# Patient Record
Sex: MALE
[De-identification: names, ages, dates, MRNs, and addresses within clinical notes are randomized per-mention and may not be internally consistent; named-entity substitution may affect disease eponyms.]

---

## 2018-07-02 ENCOUNTER — HOSPITAL ENCOUNTER (INPATIENT)
Dept: HOSPITAL 5 - ED | Age: 48
LOS: 6 days | Discharge: HOME | DRG: 393 | End: 2018-07-08
Attending: INTERNAL MEDICINE | Admitting: INTERNAL MEDICINE
Payer: SELF-PAY

## 2018-07-02 DIAGNOSIS — Z89.429: ICD-10-CM
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
## 2018-07-02 DIAGNOSIS — Z88.8: ICD-10-CM

## 2018-07-02 DIAGNOSIS — K95.89: ICD-10-CM
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
## 2018-07-02 DIAGNOSIS — K25.9: ICD-10-CM

## 2018-07-02 DIAGNOSIS — Z79.01: ICD-10-CM

## 2018-07-02 DIAGNOSIS — Z95.2: ICD-10-CM

## 2018-07-02 DIAGNOSIS — Z95.5: ICD-10-CM

## 2018-07-02 DIAGNOSIS — Z88.6: ICD-10-CM

## 2018-07-02 DIAGNOSIS — I26.99: ICD-10-CM

## 2018-07-02 DIAGNOSIS — Z79.899: ICD-10-CM

## 2018-07-02 DIAGNOSIS — Z98.84: ICD-10-CM

## 2018-07-02 DIAGNOSIS — K91.89: Primary | ICD-10-CM

## 2018-07-02 DIAGNOSIS — K28.9: ICD-10-CM

## 2018-07-02 DIAGNOSIS — Z90.49: ICD-10-CM

## 2018-07-02 DIAGNOSIS — Z82.49: ICD-10-CM

## 2018-07-02 DIAGNOSIS — Z95.1: ICD-10-CM

## 2018-07-02 LAB
APTT BLD: 26.2 SEC. (ref 24.2–36.6)
BASOPHILS # (AUTO): 0 K/MM3 (ref 0–0.1)
BASOPHILS NFR BLD AUTO: 0.4 % (ref 0–1.8)
BUN SERPL-MCNC: 10 MG/DL (ref 9–20)
BUN/CREAT SERPL: 10 %
CALCIUM SERPL-MCNC: 8.5 MG/DL (ref 8.4–10.2)
EOSINOPHIL # BLD AUTO: 0 K/MM3 (ref 0–0.4)
EOSINOPHIL NFR BLD AUTO: 0.1 % (ref 0–4.3)
HCT VFR BLD CALC: 34.2 % (ref 35.5–45.6)
HDLC SERPL-MCNC: 68 MG/DL (ref 40–59)
HEMOLYSIS INDEX: 24
HGB BLD-MCNC: 11.5 GM/DL (ref 11.8–15.2)
INR PPP: 0.91 (ref 0.87–1.13)
LYMPHOCYTES # BLD AUTO: 0.9 K/MM3 (ref 1.2–5.4)
LYMPHOCYTES NFR BLD AUTO: 20.1 % (ref 13.4–35)
MCH RBC QN AUTO: 31 PG (ref 28–32)
MCHC RBC AUTO-ENTMCNC: 34 % (ref 32–34)
MCV RBC AUTO: 91 FL (ref 84–94)
MONOCYTES # (AUTO): 0.2 K/MM3 (ref 0–0.8)
MONOCYTES % (AUTO): 5.1 % (ref 0–7.3)
PLATELET # BLD: 236 K/MM3 (ref 140–440)
RBC # BLD AUTO: 3.76 M/MM3 (ref 3.65–5.03)
T4 FREE SERPL-MCNC: 1.05 NG/DL (ref 0.76–1.46)

## 2018-07-02 PROCEDURE — 80048 BASIC METABOLIC PNL TOTAL CA: CPT

## 2018-07-02 PROCEDURE — 85730 THROMBOPLASTIN TIME PARTIAL: CPT

## 2018-07-02 PROCEDURE — 85027 COMPLETE CBC AUTOMATED: CPT

## 2018-07-02 PROCEDURE — 85025 COMPLETE CBC W/AUTO DIFF WBC: CPT

## 2018-07-02 PROCEDURE — 85018 HEMOGLOBIN: CPT

## 2018-07-02 PROCEDURE — 84439 ASSAY OF FREE THYROXINE: CPT

## 2018-07-02 PROCEDURE — 84443 ASSAY THYROID STIM HORMONE: CPT

## 2018-07-02 PROCEDURE — 93010 ELECTROCARDIOGRAM REPORT: CPT

## 2018-07-02 PROCEDURE — 87116 MYCOBACTERIA CULTURE: CPT

## 2018-07-02 PROCEDURE — 93306 TTE W/DOPPLER COMPLETE: CPT

## 2018-07-02 PROCEDURE — 85379 FIBRIN DEGRADATION QUANT: CPT

## 2018-07-02 PROCEDURE — 93005 ELECTROCARDIOGRAM TRACING: CPT

## 2018-07-02 PROCEDURE — 85610 PROTHROMBIN TIME: CPT

## 2018-07-02 PROCEDURE — 85014 HEMATOCRIT: CPT

## 2018-07-02 PROCEDURE — 85520 HEPARIN ASSAY: CPT

## 2018-07-02 PROCEDURE — 80061 LIPID PANEL: CPT

## 2018-07-02 PROCEDURE — 83880 ASSAY OF NATRIURETIC PEPTIDE: CPT

## 2018-07-02 PROCEDURE — 85049 AUTOMATED PLATELET COUNT: CPT

## 2018-07-02 PROCEDURE — 36415 COLL VENOUS BLD VENIPUNCTURE: CPT

## 2018-07-02 PROCEDURE — 84484 ASSAY OF TROPONIN QUANT: CPT

## 2018-07-02 PROCEDURE — 71275 CT ANGIOGRAPHY CHEST: CPT

## 2018-07-02 RX ADMIN — MORPHINE SULFATE PRN MG: 2 INJECTION, SOLUTION INTRAMUSCULAR; INTRAVENOUS at 20:48

## 2018-07-02 RX ADMIN — Medication SCH: at 23:01

## 2018-07-02 RX ADMIN — MORPHINE SULFATE PRN MG: 2 INJECTION, SOLUTION INTRAMUSCULAR; INTRAVENOUS at 16:38

## 2018-07-02 RX ADMIN — ALPRAZOLAM PRN MG: 1 TABLET ORAL at 23:44

## 2018-07-02 RX ADMIN — ALPRAZOLAM PRN MG: 1 TABLET ORAL at 18:37

## 2018-07-02 RX ADMIN — Medication SCH ML: at 20:50

## 2018-07-02 NOTE — EMERGENCY DEPARTMENT REPORT
ED Chest Pain HPI





- General


Chief Complaint: Chest Pain


Stated Complaint: CHEST PAIN


Time Seen by Provider: 07/02/18 10:40


Source: patient


Mode of arrival: Ambulatory


Limitations: No Limitations





- History of Present Illness


Initial Comments: 





Mr. Russo is a 47-year-old male with history of coronary artery disease 

status post 2 vessel CABG and PCI.  He presents with severe chest pain.  Pain 

is substernal radiating to the right neck.  Radiating to the left shoulder.  No 

associated shortness signs of breath.  No nausea vomiting.  3 nitroglycerin 

tabs did not provide any relief.  





One year ago he underwent 2 vessel CABG at [facility] in [facility].  Subsequently 6 months after the CABG, he underwent PCI with 

increased cardiac stents placed.  








He is unable to take aspirin or Plavix due to history of GI bleed related to 

peptic ulcer disease.  





Today he also had a fall.  He has bilateral knee abrasions.





He has relocated to [place].  He grew up in the area.  His former PCP is 

located in [place].  He has re-established care with this provider recently.





Past medical history: Coronary artery disease anemia diabetes in remission 

after gastric bypass 





Past surgery history:  recent cholecystectomy 1 week ago at [facility]


Gastric bypass


CABG


Aortic valve replacement


PCI





Medications:


Atorvastatin


Metoprolol


Protonix


Aspirin


Alprazolam


Ambien





Severity scale (0 -10): 8





- Related Data


 Allergies











Allergy/AdvReac Type Severity Reaction Status Date / Time


 


lisinopril Allergy  Shortness Verified 07/02/18 09:58





   of Breath  


 


NSAIDS (Non-Steroidal Allergy  Nausea Verified 07/02/18 09:58





Anti-Inflamma     














Heart Score





- HEART Score


History: Highly suspicious


EKG: Non-specific


Age: 45-65


Risk factors: > 3 risk factors or hx of atherosclerotic disease


Troponin: < normal limit


HEART Score: 6





ED Review of Systems


ROS: 


Stated complaint: CHEST PAIN


Other details as noted in HPI





Comment: All other systems reviewed and negative


Constitutional: denies: fever, malaise


Respiratory: denies: cough


Cardiovascular: chest pain





ED Past Medical Hx





- Past Medical History


Previous Medical History?: Yes


Hx Heart Attack/AMI: Yes


Hx Diabetes: Yes (no meds)


Additional medical history: CAD, Anemia, Chest pain





- Surgical History


Past Surgical History?: Yes


Hx Coronary Stent: Yes


Hx Open Heart Surgery: Yes


Hx Cholecystectomy: Yes





- Social History


Smoking Status: Never Smoker


Substance Use Type: Alcohol, Prescribed





ED Physical Exam





- General


Limitations: No Limitations


General appearance: alert, in no apparent distress





- Head


Head exam: Present: atraumatic, normocephalic





- Eye


Eye exam: Present: normal appearance





- ENT


ENT exam: Present: mucous membranes moist





- Neck


Neck exam: Present: normal inspection





- Respiratory


Respiratory exam: Present: normal lung sounds bilaterally.  Absent: respiratory 

distress, wheezes, rales, rhonchi





- Cardiovascular


Cardiovascular Exam: Present: regular rate, normal rhythm, normal heart sounds.

  Absent: systolic murmur, diastolic murmur, rubs, gallop





- GI/Abdominal


GI/Abdominal exam: Present: soft, normal bowel sounds.  Absent: distended, 

tenderness, guarding, rebound





- Rectal


Rectal exam: Present: deferred





- Extremities Exam


Extremities exam: Present: normal inspection





- Back Exam


Back exam: Present: normal inspection





- Neurological Exam


Neurological exam: Present: alert, oriented X3





- Psychiatric


Psychiatric exam: Present: normal affect, normal mood





- Skin


Skin exam: Present: warm, dry, intact, normal color, abrasion (bilateral knee 

abrasions, chronic healing ulcer left lower leg ).  Absent: rash





ED Course





 Vital Signs











  07/02/18 07/02/18





  09:58 10:22


 


Temperature 97.8 F 97.9 F


 


Pulse Rate 56 L 53 L


 


Respiratory 18 13





Rate  


 


Blood Pressure 146/82 


 


Blood Pressure  151/84





[Left]  


 


O2 Sat by Pulse 99 99





Oximetry  














ED Medical Decision Making





- Lab Data


Result diagrams: 


 07/02/18 10:10





 07/02/18 10:11








 Laboratory Results - last 24 hr











  07/02/18 07/02/18 07/02/18





  10:10 10:11 10:11


 


WBC  4.4 L  


 


RBC  3.76  


 


Hgb  11.5 L  


 


Hct  34.2 L  


 


MCV  91  


 


MCH  31  


 


MCHC  34  


 


RDW  15.9 H  


 


Plt Count  236  


 


Lymph % (Auto)  20.1  


 


Mono % (Auto)  5.1  


 


Eos % (Auto)  0.1  


 


Baso % (Auto)  0.4  


 


Lymph #  0.9 L  


 


Mono #  0.2  


 


Eos #  0.0  


 


Baso #  0.0  


 


Seg Neutrophils %  74.3 H  


 


Seg Neutrophils #  3.3  


 


PT   12.7 


 


INR   0.91 


 


APTT   26.2 


 


Sodium    141


 


Potassium    4.0


 


Chloride    104.6


 


Carbon Dioxide    25


 


Anion Gap    15


 


BUN    10


 


Creatinine    1.0


 


Estimated GFR    > 60


 


BUN/Creatinine Ratio    10


 


Glucose    123 H


 


Calcium    8.5


 


Troponin T    < 0.010














- Radiology Data


Radiology results: report reviewed, image reviewed





- Medical Decision Making





Mr. Russo is a 46 yo male with hx of CAD s/p CABG and PCI who presents with 

ACS.  He also reported fall without syncope.  No significant due to the fall.





Admitted to hospitalist service.  I have also consulted cardiology.


Critical care attestation.: 


If time is entered above; I have spent that time in minutes in the direct care 

of this critically ill patient, excluding procedure time.








ED Disposition


Clinical Impression: 


 ACS (acute coronary syndrome), Fall from ground level





Disposition: DC-09 OP ADMIT IP TO THIS HOSP


Is pt being admited?: Yes


Does the pt Need Aspirin: No


Condition: Stable


Time of Disposition: 11:20

## 2018-07-02 NOTE — HISTORY AND PHYSICAL REPORT
History of Present Illness


Date of admission: 


07/02/18 12:37





Chief complaint: 





Im having chest pain


History of present illness: 





46 YO Male with HTN, Aortic Stenosis S/P AVR, CAD S/P CABG and Stent Placement, 

presents to ED for evaluation. Pt states that he has experienced pain in his 

chest for the past 1 day. Pt states that pain awoke him from sleep. Pt stats 

that pain is 6/10, Substernal, nonradiating, not worsened with exertion, or 

relieved with rest. Pt acknowledges loss of consciousness. Pt denies fever, 

chills, palpitations, NVD, unilateral leg swelling, calf pain, prolonged travel/

immobility, hemoptysis, productive cough, trauma, or recent ill contacts. Pt 

seen and evaluated in ED and found to have symptoms consistent with ACS as well 

as CHF in addition to sinus bradycardia. Pt admitted to telemetry. Cardiology 

team consulted in ED. 








Past History


Past Medical History: CAD, hypertension


Past Surgical History: valve replacement, cholecystectomy, CABG, Other (Stent 

placement, Gastric Bypass)


Social history: single.  denies: smoking, alcohol abuse, prescription drug abuse


Family history: hypertension





Medications and Allergies


 Allergies











Allergy/AdvReac Type Severity Reaction Status Date / Time


 


lisinopril Allergy  Shortness Verified 07/02/18 09:58





   of Breath  


 


NSAIDS (Non-Steroidal Allergy  Nausea Verified 07/02/18 09:58





Anti-Inflamma     











 Home Medications











 Medication  Instructions  Recorded  Confirmed  Last Taken  Type


 


ALPRAZolam [Xanax TAB] 1 mg PO TID PRN 07/02/18 07/02/18 Unknown History


 


AtorvaSTATin [Lipitor] 40 mg PO QHS 07/02/18 07/02/18 Unknown History


 


Cyanocobalamin [Vitamin B-12] 1,000 mcg PO DAILY 07/02/18 07/02/18 Unknown 

History


 


Losartan/Hydrochlorothiazide 1 each PO DAILY 07/02/18 07/02/18 Unknown History





[Losartan-Hctz 50-12.5 mg Tab]     


 


Metoprolol [Lopressor TAB] 50 mg PO PRN 07/02/18 07/02/18 Unknown History


 


Pantoprazole [Protonix] 40 mg PO QDAY 07/02/18 07/02/18 Unknown History


 


Zolpidem Tartrate [Ambien CR] 12.5 mg PO QHS 07/02/18 07/02/18 Unknown History











Active Meds: 


Active Medications





Acetaminophen (Tylenol)  650 mg PO Q4H PRN


   PRN Reason: Pain MILD(1-3)/Fever >100.5/HA


   Last Admin: 07/02/18 14:10 Dose:  650 mg


Alprazolam (Xanax)  1 mg PO TID PRN


   PRN Reason: Anxiety


Morphine Sulfate (Morphine)  2 mg IV Q4H PRN


   PRN Reason: Pain, Moderate (4-6)


   Last Admin: 07/02/18 16:38 Dose:  2 mg


Nitroglycerin (Nitrostat)  0.4 mg SL Q5M PRN


   PRN Reason: Chest Pain


   Last Admin: 07/02/18 13:06 Dose:  0.4 mg


Ondansetron HCl (Zofran)  4 mg IV Q8H PRN


   PRN Reason: Nausea And Vomiting


Sodium Chloride (Sodium Chloride Flush Syringe 10 Ml)  10 ml IV BID YU


Sodium Chloride (Sodium Chloride Flush Syringe 10 Ml)  10 ml IV PRN PRN


   PRN Reason: LINE FLUSH


Sodium Chloride (Sodium Chloride Flush Syringe 10 Ml)  10 ml IV PRN PRN


   PRN Reason: LINE FLUSH











Review of Systems


Constitutional: no weight loss, no weight gain, no fever, no chills


Ears, nose, mouth and throat: no ear pain, no ear discharge, no tinnitis, no 

decreased hearing, no nose pain, no nasal congestion, no sinus pressure


Cardiovascular: chest pain, no orthopnea, no palpitations


Respiratory: no cough, no cough with sputum, no excessive sputum, no hemoptysis

, no shortness of breath


Gastrointestinal: no nausea, no vomiting, no diarrhea, no constipation


Genitourinary Male: no hematuria, no flank pain, no discharge, no urinary 

frequency, no urinary hesitancy, no nocturia


Rectal: no pain, no incontinence, no bleeding


Musculoskeletal: no neck stiffness, no neck pain, no shooting arm pain, no arm 

numbness/tingling, no low back pain, no shooting leg pain


Integumentary: no rash, no pruritis, no redness, no sores, no wounds


Neurological: no paralysis, no weakness, no parathesias, no numbness, no 

tingling, no seizures


Psychiatric: no memory loss, no change in sleep habits, no sleep disturbances, 

no insomnia, no hypersomnia, no change in appetite, no change in libido, no 

suicidal ideation


Endocrine: no cold intolerance, no heat intolerance, no polyphagia, no 

excessive thirst, no polydipsia, no polyuria, no nocturia, no excessive sweating


Hematologic/Lymphatic: no easy bruising, no easy bleeding, no lymphadenopathy, 

no lymphedema


Allergic/Immunologic: no urticaria, no allergic rhinitis, no wheezing, no 

persistent infections, no anaphylaxis, no angioedema





Exam





- Constitutional


Vitals: 


 











Temp Pulse Resp BP Pulse Ox


 


 97.9 F   59 L  20   130/75   98 


 


 07/02/18 16:33  07/02/18 16:33  07/02/18 16:38  07/02/18 16:33  07/02/18 16:33











General appearance: Present: mild distress, cachectic





- EENT


Eyes: Present: PERRL


ENT: hearing intact, clear oral mucosa





- Neck


Neck: Present: supple, normal ROM





- Respiratory


Respiratory effort: normal


Respiratory: bilateral: CTA





- Cardiovascular


Heart Sounds: Present: S1 & S2.  Absent: rub, click





- Extremities


Extremities: pulses symmetrical, No edema


Extremity abnormal: edema


Peripheral Pulses: within normal limits





- Abdominal


General gastrointestinal: Present: soft, non-tender, non-distended, normal 

bowel sounds


Male genitourinary: Present: normal





- Integumentary


Integumentary: Present: clear, warm, dry





- Musculoskeletal


Musculoskeletal: gait normal, strength equal bilaterally





- Psychiatric


Psychiatric: appropriate mood/affect, intact judgment & insight





- Neurologic


Neurologic: CNII-XII intact, moves all extremities





Results





- Labs


CBC & Chem 7: 


 07/02/18 10:10





 07/02/18 10:11


Labs: 


 Abnormal lab results











  07/02/18 07/02/18 07/02/18 Range/Units





  10:10 10:11 10:11 


 


WBC  4.4 L    (4.5-11.0)  K/mm3


 


Hgb  11.5 L    (11.8-15.2)  gm/dl


 


Hct  34.2 L    (35.5-45.6)  %


 


RDW  15.9 H    (13.2-15.2)  %


 


Lymph #  0.9 L    (1.2-5.4)  K/mm3


 


Seg Neutrophils %  74.3 H    (40.0-70.0)  %


 


D-Dimer     (0-234)  ng/mlDDU


 


Glucose   123 H   ()  mg/dL


 


NT-Pro-B Natriuret Pep    5237 H  (0-450)  pg/mL


 


HDL Cholesterol     (40-59)  mg/dL














  07/02/18 07/02/18 Range/Units





  10:11 10:36 


 


WBC    (4.5-11.0)  K/mm3


 


Hgb    (11.8-15.2)  gm/dl


 


Hct    (35.5-45.6)  %


 


RDW    (13.2-15.2)  %


 


Lymph #    (1.2-5.4)  K/mm3


 


Seg Neutrophils %    (40.0-70.0)  %


 


D-Dimer   840.79 H  (0-234)  ng/mlDDU


 


Glucose    ()  mg/dL


 


NT-Pro-B Natriuret Pep    (0-450)  pg/mL


 


HDL Cholesterol  68 H   (40-59)  mg/dL














Assessment and Plan





- Patient Problems


(1) CHF (congestive heart failure)


Current Visit: Yes   Status: Acute   


Qualifiers: 


   Heart failure type: systolic   Heart failure chronicity: acute   Qualified 

Code(s): I50.21 - Acute systolic (congestive) heart failure   


Plan to address problem: 


Admit to telemetry, cardiology consulted in ED, Strict I/O, monitor uop q shift

, daily weight, Echo, supplemental oxygen, BNP, D dimer, 








(2) CAD (coronary artery disease)


Current Visit: Yes   Status: Acute   


Qualifiers: 


   Associated angina: with stable angina 


Plan to address problem: 


Cardiology consulted, admit to telemetry, risk factor reduction, 








(3) ACS (acute coronary syndrome)


Current Visit: Yes   Status: Acute   


Plan to address problem: 


serial cardiac enzymes, ekg, telemetry, cardiology consulted. 








(4) DVT prophylaxis


Current Visit: Yes   Status: Acute   


Plan to address problem: 


SCD to ble while in bed.

## 2018-07-02 NOTE — CONSULTATION
History of Present Illness


Consult date: 07/02/18


Consult reason: chest pain


History of present illness: 





This is a 47yr old male who presents to the emergency department with chest 

pain and syncope thus this cardiac consultation. Patient reports he woke up 

with chest pain, got out of bed and passed out sustaining abrasions to his 

upper and lower extremities. He denies shortness of breath and palpations. 12 

lead ECG is a sinus bradycardia, rate 50, with non-specific Twave abnormalities.





Patient reports a history of coronary artery disease and underwent 2 vessel 

bypass grafting with bioprosthetic aortic valve replacement just over a year 

ago while living in [place]. He has recently relocated to [place] and has yet 

to establish cardiac care. Patient also reports a history of gastric bypass in 

2009, Hypertension and recent cholecystectomy at an outside hospital.





Medications and Allergies


 Allergies











Allergy/AdvReac Type Severity Reaction Status Date / Time


 


lisinopril Allergy  Shortness Verified 07/02/18 09:58





   of Breath  


 


NSAIDS (Non-Steroidal Allergy  Nausea Verified 07/02/18 09:58





Anti-Inflamma     














Physical Examination


 Vital Signs











Temp Pulse Resp BP Pulse Ox


 


 97.8 F   56 L  18   146/82   99 


 


 07/02/18 09:58  07/02/18 09:58  07/02/18 09:58  07/02/18 09:58  07/02/18 09:58











General appearance: no acute distress


HEENT: Positive: PERRL


Cardiac: Positive: Reg Rate and Rhythm


Lungs: Positive: Decreased Breath Sounds


Neuro: Positive: Grossly Intact





Results





 07/02/18 10:10





 07/02/18 10:11


 Coagulation











  07/02/18 Range/Units





  10:11 


 


PT  12.7  (12.2-14.9)  Sec.


 


INR  0.91  (0.87-1.13)  


 


APTT  26.2  (24.2-36.6)  Sec.








 CBC











  07/02/18 Range/Units





  10:10 


 


WBC  4.4 L  (4.5-11.0)  K/mm3


 


RBC  3.76  (3.65-5.03)  M/mm3


 


Hgb  11.5 L  (11.8-15.2)  gm/dl


 


Hct  34.2 L  (35.5-45.6)  %


 


Plt Count  236  (140-440)  K/mm3


 


Lymph #  0.9 L  (1.2-5.4)  K/mm3


 


Mono #  0.2  (0.0-0.8)  K/mm3


 


Eos #  0.0  (0.0-0.4)  K/mm3


 


Baso #  0.0  (0.0-0.1)  K/mm3








 Comprehensive Metabolic Panel











  07/02/18 Range/Units





  10:11 


 


Sodium  141  (137-145)  mmol/L


 


Potassium  4.0  (3.6-5.0)  mmol/L


 


Chloride  104.6  ()  mmol/L


 


Carbon Dioxide  25  (22-30)  mmol/L


 


BUN  10  (9-20)  mg/dL


 


Creatinine  1.0  (0.8-1.5)  mg/dL


 


Glucose  123 H  ()  mg/dL


 


Calcium  8.5  (8.4-10.2)  mg/dL














Assessment and Plan





Syncope


Chest pain


Hx of CAD with 2v CABG and bioprosthetic AVR 2016 while living in [place]


Hypertension


Hx of Gastric bypass 2009

## 2018-07-03 LAB
APTT BLD: 25.1 SEC. (ref 24.2–36.6)
HCT VFR BLD CALC: 35.6 % (ref 35.5–45.6)
HGB BLD-MCNC: 11.5 GM/DL (ref 11.8–15.2)
INR PPP: 0.93 (ref 0.87–1.13)
PLATELET # BLD: 232 K/MM3 (ref 140–440)

## 2018-07-03 RX ADMIN — LOSARTAN POTASSIUM SCH: 50 TABLET, FILM COATED ORAL at 10:09

## 2018-07-03 RX ADMIN — Medication SCH ML: at 22:11

## 2018-07-03 RX ADMIN — ALPRAZOLAM PRN MG: 1 TABLET ORAL at 14:23

## 2018-07-03 RX ADMIN — MORPHINE SULFATE PRN MG: 2 INJECTION, SOLUTION INTRAMUSCULAR; INTRAVENOUS at 21:00

## 2018-07-03 RX ADMIN — MORPHINE SULFATE PRN MG: 2 INJECTION, SOLUTION INTRAMUSCULAR; INTRAVENOUS at 07:10

## 2018-07-03 RX ADMIN — HYDROCHLOROTHIAZIDE SCH MG: 12.5 CAPSULE ORAL at 10:04

## 2018-07-03 RX ADMIN — ALPRAZOLAM PRN MG: 1 TABLET ORAL at 06:43

## 2018-07-03 RX ADMIN — MORPHINE SULFATE PRN MG: 2 INJECTION, SOLUTION INTRAMUSCULAR; INTRAVENOUS at 03:16

## 2018-07-03 RX ADMIN — Medication SCH MCG: at 10:03

## 2018-07-03 RX ADMIN — ALPRAZOLAM PRN MG: 1 TABLET ORAL at 22:10

## 2018-07-03 RX ADMIN — LOSARTAN POTASSIUM SCH: 50 TABLET, FILM COATED ORAL at 09:58

## 2018-07-03 RX ADMIN — FAMOTIDINE SCH MG: 10 INJECTION, SOLUTION INTRAVENOUS at 22:10

## 2018-07-03 RX ADMIN — HEPARIN SODIUM SCH MLS/HR: 5000 INJECTION, SOLUTION INTRAVENOUS at 14:24

## 2018-07-03 RX ADMIN — Medication SCH ML: at 10:10

## 2018-07-03 RX ADMIN — MORPHINE SULFATE PRN MG: 2 INJECTION, SOLUTION INTRAMUSCULAR; INTRAVENOUS at 16:51

## 2018-07-03 RX ADMIN — MORPHINE SULFATE PRN MG: 2 INJECTION, SOLUTION INTRAMUSCULAR; INTRAVENOUS at 12:30

## 2018-07-03 RX ADMIN — ZOLPIDEM TARTRATE PRN MG: 10 TABLET, FILM COATED ORAL at 23:18

## 2018-07-03 NOTE — CAT SCAN REPORT
CTA CHEST:



HISTORY: Syncope.



COMPARISON: none.



TECHNIQUE: Helical CT in 1.25mm intervals following IV contrast.  

Pulmonary embolus protocol.  Sagittal and coronal reformatted images.  

Rotational MIP images.



FINDINGS:





Contrast bolus is satisfactory. A solitary, small, nonocclusive filling 

defect is identified in the second and third order pulmonary arterial 

branches leading to the left lower lobe. This is best demonstrated on 

image 127, series 4. No other emboli are identified.



Thyroid gland: Normal.



Tracheobronchial tree: Normal.



Esophagus: Normal.



Heart: The heart is normal size. Moderate coronary artery 

calcifications are identified. Previous aortic valve replacement 

changes are noted.



Pericardium: Normal.



Mediastinum: Normal.



Lung Fields: normal.



Pleural Spaces: Normal.



Musculoskeletal: Mild thoracic spondylosis. No fracture or suspicious 

bony lesion.





IMPRESSION: 

Positive for pulmonary embolus as described.



These findings were relayed to the patient's RN, Jacque, on 4A at 1104 

hrs.

## 2018-07-03 NOTE — PROGRESS NOTE
Assessment and Plan


Assessment and plan: 


Chest pain due to acute pulmonary embolism.





Acute pulmonary embolism on left.


Seen on CT angiogra of chest.


I informed and discussed with him. he states he has history of PE and DVT and 

was taken off Xarelto few months ago because of history of GI bleed , anemia. 

he also had subdural hematoma last year.


Start heparin drip.


Consult Hematology on call





Coronary artery disease s/p CABG


Cardiology following.


I discussed case with cardiology





Hypertension





Aortic stenosis s/p aortic valve replacement with bioprosthesis





History of pulmonry embolism





History of DVT





History of subdural hematoma





History of gastric bypass and massive weight loss.





Full code status.








History


Interval history: 


Chest pain








Hospitalist Physical





- Physical exam


Narrative exam: 


Gen : Not in acute distress,


HEENT:Normocephalic, atraumatic


Neck: supple, No JVD


Lungs: Clear to auscultation, bilaterally, no rhonchi


Heart :S1 and S2 reg, no murmurs, rubs or gallop


Abd:soft, non tender, non distended, normal bowel sounds


Ext: No edema, no clubbing, no cyanosis, 


Neuro: Awake,alert,oriented x 3, no focal signs


Psych:normal mood














- Constitutional


Vitals: 


 











Temp Pulse Resp BP Pulse Ox


 


 97.9 F   48 L  18   138/76   99 


 


 07/03/18 07:39  07/03/18 07:39  07/03/18 07:39  07/03/18 07:39  07/03/18 07:39














Results





- Labs


CBC & Chem 7: 


 07/03/18 13:22





 07/02/18 10:11


Labs: 


 Laboratory Last Values











WBC  4.4 K/mm3 (4.5-11.0)  L  07/02/18  10:10    


 


RBC  3.76 M/mm3 (3.65-5.03)   07/02/18  10:10    


 


Hgb  11.5 gm/dl (11.8-15.2)  L  07/02/18  10:10    


 


Hct  34.2 % (35.5-45.6)  L  07/02/18  10:10    


 


MCV  91 fl (84-94)   07/02/18  10:10    


 


MCH  31 pg (28-32)   07/02/18  10:10    


 


MCHC  34 % (32-34)   07/02/18  10:10    


 


RDW  15.9 % (13.2-15.2)  H  07/02/18  10:10    


 


Plt Count  236 K/mm3 (140-440)   07/02/18  10:10    


 


Lymph % (Auto)  20.1 % (13.4-35.0)   07/02/18  10:10    


 


Mono % (Auto)  5.1 % (0.0-7.3)   07/02/18  10:10    


 


Eos % (Auto)  0.1 % (0.0-4.3)   07/02/18  10:10    


 


Baso % (Auto)  0.4 % (0.0-1.8)   07/02/18  10:10    


 


Lymph #  0.9 K/mm3 (1.2-5.4)  L  07/02/18  10:10    


 


Mono #  0.2 K/mm3 (0.0-0.8)   07/02/18  10:10    


 


Eos #  0.0 K/mm3 (0.0-0.4)   07/02/18  10:10    


 


Baso #  0.0 K/mm3 (0.0-0.1)   07/02/18  10:10    


 


Seg Neutrophils %  74.3 % (40.0-70.0)  H  07/02/18  10:10    


 


Seg Neutrophils #  3.3 K/mm3 (1.8-7.7)   07/02/18  10:10    


 


PT  12.7 Sec. (12.2-14.9)   07/02/18  10:11    


 


INR  0.91  (0.87-1.13)   07/02/18  10:11    


 


APTT  26.2 Sec. (24.2-36.6)   07/02/18  10:11    


 


D-Dimer  840.79 ng/mlDDU (0-234)  H  07/02/18  10:36    


 


Sodium  141 mmol/L (137-145)   07/02/18  10:11    


 


Potassium  4.0 mmol/L (3.6-5.0)   07/02/18  10:11    


 


Chloride  104.6 mmol/L ()   07/02/18  10:11    


 


Carbon Dioxide  25 mmol/L (22-30)   07/02/18  10:11    


 


Anion Gap  15 mmol/L  07/02/18  10:11    


 


BUN  10 mg/dL (9-20)   07/02/18  10:11    


 


Creatinine  1.0 mg/dL (0.8-1.5)   07/02/18  10:11    


 


Estimated GFR  > 60 ml/min  07/02/18  10:11    


 


BUN/Creatinine Ratio  10 %  07/02/18  10:11    


 


Glucose  123 mg/dL ()  H  07/02/18  10:11    


 


Calcium  8.5 mg/dL (8.4-10.2)   07/02/18  10:11    


 


Troponin T  < 0.010 ng/mL (0.00-0.029)   07/02/18  19:05    


 


NT-Pro-B Natriuret Pep  5237 pg/mL (0-450)  H  07/02/18  10:11    


 


Triglycerides  119 mg/dL (2-149)   07/02/18  10:11    


 


Cholesterol  150 mg/dL ()   07/02/18  10:11    


 


LDL Cholesterol Direct  73 mg/dL ()   07/02/18  10:11    


 


HDL Cholesterol  68 mg/dL (40-59)  H  07/02/18  10:11    


 


Cholesterol/HDL Ratio  2.20 %  07/02/18  10:11    


 


TSH  1.120 mlU/mL (0.270-4.200)   07/02/18  19:05    


 


Free T4  1.05 ng/dL (0.76-1.46)   07/02/18  19:05

## 2018-07-03 NOTE — PROGRESS NOTE
Assessment and Plan





Syncope


Acute PE


Hx of CAD with 2v CABG and bioprosthetic AVR 2016 while living in [place]


Hypertension


Hx of Gastric bypass 2009


Hx of Subdural Hematoma





Recommendations:


Obtain prior cardiac records for review.


Echocardiogram for left ventricular function assessment and bioprosthetic 

aortic valve assessment.








Subjective


Date of service: 07/03/18


Interval history: 





No cardiac events overnight.





Objective


 Vital Signs











  Temp Pulse Resp BP BP Pulse Ox


 


 07/03/18 12:47  98.0 F  55 L  18  152/82   99


 


 07/03/18 10:09   48 L    


 


 07/03/18 09:58   48 L    


 


 07/03/18 08:00    18   


 


 07/03/18 07:39  97.9 F  48 L  18  138/76   99


 


 07/03/18 05:00   48 L    


 


 07/03/18 04:55  97.7 F   20  150/71  


 


 07/02/18 22:45  98.3 F  52 L  18  145/85   99


 


 07/02/18 21:22   64    


 


 07/02/18 21:05    18   


 


 07/02/18 19:27   63   116/68   99


 


 07/02/18 18:37   59 L   130/75  


 


 07/02/18 17:08    20   


 


 07/02/18 16:38    20   


 


 07/02/18 16:33  97.9 F  59 L  18   130/75  98


 


 07/02/18 16:03  97.9 F  59 L  18  130/75   98


 


 07/02/18 15:54   73  11 L   114/78  99


 


 07/02/18 14:50   72  19  114/78   99


 


 07/02/18 14:40   75  14  114/78   100


 


 07/02/18 14:30   75  20  114/78   100


 


 07/02/18 14:20   70  23  114/78   100


 


 07/02/18 14:10   71  19  114/78   100


 


 07/02/18 14:00   73  11 L  114/78   99


 


 07/02/18 13:50   73  12  158/86   100


 


 07/02/18 13:40   71  15  158/86   100














- Physical Examination


General: No Apparent Distress


HEENT: Positive: PERRL


Cardiac: Positive: Bradycardia


Neuro: Positive: Grossly Intact

## 2018-07-03 NOTE — EVENT NOTE
Date: 07/03/18


Called by Nurse that patient has Pulmonry Embolism on CT Angio of Chest. Start 

Lovenox 1mg/kg subcut bid.

## 2018-07-04 LAB
BUN SERPL-MCNC: 18 MG/DL (ref 9–20)
BUN/CREAT SERPL: 23 %
CALCIUM SERPL-MCNC: 7.6 MG/DL (ref 8.4–10.2)
HCT VFR BLD CALC: 30.9 % (ref 35.5–45.6)
HEMOLYSIS INDEX: 3
HGB BLD-MCNC: 10.4 GM/DL (ref 11.8–15.2)
MCH RBC QN AUTO: 31 PG (ref 28–32)
MCHC RBC AUTO-ENTMCNC: 34 % (ref 32–34)
MCV RBC AUTO: 91 FL (ref 84–94)
PLATELET # BLD: 177 K/MM3 (ref 140–440)
RBC # BLD AUTO: 3.4 M/MM3 (ref 3.65–5.03)

## 2018-07-04 RX ADMIN — LOSARTAN POTASSIUM SCH MG: 50 TABLET, FILM COATED ORAL at 10:00

## 2018-07-04 RX ADMIN — ALPRAZOLAM PRN MG: 1 TABLET ORAL at 14:23

## 2018-07-04 RX ADMIN — Medication SCH ML: at 10:00

## 2018-07-04 RX ADMIN — MORPHINE SULFATE PRN MG: 2 INJECTION, SOLUTION INTRAMUSCULAR; INTRAVENOUS at 03:27

## 2018-07-04 RX ADMIN — Medication SCH ML: at 22:19

## 2018-07-04 RX ADMIN — MORPHINE SULFATE PRN MG: 2 INJECTION, SOLUTION INTRAMUSCULAR; INTRAVENOUS at 08:23

## 2018-07-04 RX ADMIN — OXYCODONE AND ACETAMINOPHEN PRN TAB: 5; 325 TABLET ORAL at 16:13

## 2018-07-04 RX ADMIN — ONDANSETRON PRN MG: 2 INJECTION INTRAMUSCULAR; INTRAVENOUS at 17:00

## 2018-07-04 RX ADMIN — MORPHINE SULFATE PRN MG: 2 INJECTION, SOLUTION INTRAMUSCULAR; INTRAVENOUS at 12:45

## 2018-07-04 RX ADMIN — HYDROCHLOROTHIAZIDE SCH MG: 12.5 CAPSULE ORAL at 10:00

## 2018-07-04 RX ADMIN — ALPRAZOLAM PRN MG: 1 TABLET ORAL at 06:20

## 2018-07-04 RX ADMIN — HEPARIN SODIUM SCH MLS/HR: 5000 INJECTION, SOLUTION INTRAVENOUS at 15:13

## 2018-07-04 RX ADMIN — FAMOTIDINE SCH MG: 10 INJECTION, SOLUTION INTRAVENOUS at 22:19

## 2018-07-04 RX ADMIN — FAMOTIDINE SCH MG: 10 INJECTION, SOLUTION INTRAVENOUS at 09:59

## 2018-07-04 RX ADMIN — OXYCODONE AND ACETAMINOPHEN PRN TAB: 5; 325 TABLET ORAL at 20:07

## 2018-07-04 RX ADMIN — ZOLPIDEM TARTRATE PRN MG: 10 TABLET, FILM COATED ORAL at 22:19

## 2018-07-04 RX ADMIN — HEPARIN SODIUM SCH MLS/HR: 5000 INJECTION, SOLUTION INTRAVENOUS at 11:35

## 2018-07-04 RX ADMIN — Medication SCH MCG: at 10:00

## 2018-07-04 NOTE — PROGRESS NOTE
Assessment and Plan


Assessment and plan: 


Chest pain due to acute pulmonary embolism.





Acute pulmonary embolism on left.


Seen on CT angiogram of chest.


I informed and discussed with him. He states he has history of PE and DVT and 

was taken off Xarelto few months ago because of history of GI bleed , anemia. 

he also had subdural hematoma last year.


Continue heparin drip.


Consulted Hematology on call, to determine long term anticoagulation.





Coronary artery disease s/p CABG


Cardiology following.


I discussed case with cardiology





Hypertension





Aortic stenosis s/p aortic valve replacement with bioprosthesis





History of pulmonry embolism





History of DVT





History of subdural hematoma





History of gastric bypass and massive weight loss.





Full code status.








History


Interval history: 


Chest pain








Hospitalist Physical





- Physical exam


Narrative exam: 


Gen : Not in acute distress,


HEENT:Normocephalic, atraumatic


Neck: supple, No JVD


Lungs: Clear to auscultation, bilaterally, no rhonchi


Heart :S1 and S2 reg, no murmurs, rubs or gallop


Abd:soft, non tender, non distended, normal bowel sounds


Ext: No edema, no clubbing, no cyanosis, 


Neuro: Awake,alert,oriented x 3, no focal signs


Psych:normal mood














- Constitutional


Vitals: 


 











Temp Pulse Resp BP Pulse Ox


 


 98.1 F   54 L  20   135/82   98 


 


 07/04/18 11:21  07/04/18 12:04  07/04/18 11:21  07/04/18 11:21  07/04/18 11:21














Results





- Labs


CBC & Chem 7: 


 07/04/18 04:51





 07/04/18 04:51


Labs: 


 Laboratory Last Values











WBC  4.8 K/mm3 (4.5-11.0)   07/04/18  04:51    


 


RBC  3.40 M/mm3 (3.65-5.03)  L  07/04/18  04:51    


 


Hgb  10.4 gm/dl (11.8-15.2)  L  07/04/18  04:51    


 


Hct  30.9 % (35.5-45.6)  L  07/04/18  04:51    


 


MCV  91 fl (84-94)   07/04/18  04:51    


 


MCH  31 pg (28-32)   07/04/18  04:51    


 


MCHC  34 % (32-34)   07/04/18  04:51    


 


RDW  16.0 % (13.2-15.2)  H  07/04/18  04:51    


 


Plt Count  177 K/mm3 (140-440)   07/04/18  04:51    


 


Lymph % (Auto)  20.1 % (13.4-35.0)   07/02/18  10:10    


 


Mono % (Auto)  5.1 % (0.0-7.3)   07/02/18  10:10    


 


Eos % (Auto)  0.1 % (0.0-4.3)   07/02/18  10:10    


 


Baso % (Auto)  0.4 % (0.0-1.8)   07/02/18  10:10    


 


Lymph #  0.9 K/mm3 (1.2-5.4)  L  07/02/18  10:10    


 


Mono #  0.2 K/mm3 (0.0-0.8)   07/02/18  10:10    


 


Eos #  0.0 K/mm3 (0.0-0.4)   07/02/18  10:10    


 


Baso #  0.0 K/mm3 (0.0-0.1)   07/02/18  10:10    


 


Seg Neutrophils %  74.3 % (40.0-70.0)  H  07/02/18  10:10    


 


Seg Neutrophils #  3.3 K/mm3 (1.8-7.7)   07/02/18  10:10    


 


PT  12.9 Sec. (12.2-14.9)   07/03/18  13:22    


 


INR  0.93  (0.87-1.13)   07/03/18  13:22    


 


APTT  25.1 Sec. (24.2-36.6)   07/03/18  13:22    


 


D-Dimer  840.79 ng/mlDDU (0-234)  H  07/02/18  10:36    


 


Heparin Anti-Xa Level  0.29 U.I./ml (0.3-0.7)  L  07/04/18  04:51    


 


Sodium  141 mmol/L (137-145)   07/04/18  04:51    


 


Potassium  3.8 mmol/L (3.6-5.0)   07/04/18  04:51    


 


Chloride  107.8 mmol/L ()  H  07/04/18  04:51    


 


Carbon Dioxide  25 mmol/L (22-30)   07/04/18  04:51    


 


Anion Gap  12 mmol/L  07/04/18  04:51    


 


BUN  18 mg/dL (9-20)   07/04/18  04:51    


 


Creatinine  0.8 mg/dL (0.8-1.5)   07/04/18  04:51    


 


Estimated GFR  > 60 ml/min  07/04/18  04:51    


 


BUN/Creatinine Ratio  23 %  07/04/18  04:51    


 


Glucose  85 mg/dL ()   07/04/18  04:51    


 


Calcium  7.6 mg/dL (8.4-10.2)  L  07/04/18  04:51    


 


Troponin T  < 0.010 ng/mL (0.00-0.029)   07/02/18  19:05    


 


NT-Pro-B Natriuret Pep  5237 pg/mL (0-450)  H  07/02/18  10:11    


 


Triglycerides  119 mg/dL (2-149)   07/02/18  10:11    


 


Cholesterol  150 mg/dL ()   07/02/18  10:11    


 


LDL Cholesterol Direct  73 mg/dL ()   07/02/18  10:11    


 


HDL Cholesterol  68 mg/dL (40-59)  H  07/02/18  10:11    


 


Cholesterol/HDL Ratio  2.20 %  07/02/18  10:11    


 


TSH  1.120 mlU/mL (0.270-4.200)   07/02/18  19:05    


 


Free T4  1.05 ng/dL (0.76-1.46)   07/02/18  19:05

## 2018-07-04 NOTE — PROGRESS NOTE
Assessment and Plan





- Patient Problems


(1) History of aortic valve replacement with bioprosthetic valve


Current Visit: Yes   Status: Acute   


Plan to address problem: 


Echocardiogram shows a well functioning prosthetic valve with a minimal 

transaortic gradient of 9 mmHg.








(2) CAD (coronary artery disease)


Current Visit: Yes   Status: Acute   


Qualifiers: 


   Associated angina: with stable angina 


Plan to address problem: 


Coronary disease is stable and asymptomatic.  Continue medical therapy.








Subjective


Date of service: 07/04/18


Interval history: 





Patient is undergoing anticoagulation therapy for acute pulmonary embolism.  

Risks and benefits of anticoagulation therapy is being evaluated, given his 

history of GI bleed and subdural hematoma.





Objective


 Vital Signs











  Temp Pulse Resp BP Pulse Ox


 


 07/04/18 10:00   77   130/80 


 


 07/04/18 09:14    16  


 


 07/04/18 08:23    16  


 


 07/04/18 07:20  98.3 F  54 L  18  131/69  97


 


 07/04/18 06:05  97.9 F  54 L  20  147/77  98


 


 07/03/18 23:07  97.8 F  56 L  20  133/92  98


 


 07/03/18 19:50  98.3 F  55 L  20  137/84  98


 


 07/03/18 15:52  98.0 F  50 L  18  149/92  98


 


 07/03/18 12:47  98.0 F  55 L  18  152/82  99


 


 07/03/18 11:00      99














- Physical Examination


General: No Apparent Distress


HEENT: Positive: PERRL


Neck: Positive: neck supple


Cardiac: Positive: Reg Rate and Rhythm


Lungs: Positive: Decreased Breath Sounds


Neuro: Positive: Grossly Intact


Abdomen: Positive: Soft


Skin: Positive: Clear


Extremities: Absent: edema





- Labs and Meds


 Coagulation











  07/03/18 Range/Units





  13:22 


 


PT  12.9  (12.2-14.9)  Sec.


 


INR  0.93  (0.87-1.13)  


 


APTT  25.1  (24.2-36.6)  Sec.








 CBC











  07/03/18 07/04/18 Range/Units





  13:22 04:51 


 


WBC   4.8  (4.5-11.0)  K/mm3


 


RBC   3.40 L  (3.65-5.03)  M/mm3


 


Hgb  11.5 L  10.4 L  (11.8-15.2)  gm/dl


 


Hct  35.6  30.9 L  (35.5-45.6)  %


 


Plt Count  232  177  (140-440)  K/mm3








 Comprehensive Metabolic Panel











  07/04/18 Range/Units





  04:51 


 


Sodium  141  (137-145)  mmol/L


 


Potassium  3.8  (3.6-5.0)  mmol/L


 


Chloride  107.8 H  ()  mmol/L


 


Carbon Dioxide  25  (22-30)  mmol/L


 


BUN  18  (9-20)  mg/dL


 


Creatinine  0.8  (0.8-1.5)  mg/dL


 


Glucose  85  ()  mg/dL


 


Calcium  7.6 L  (8.4-10.2)  mg/dL

## 2018-07-05 LAB
HCT VFR BLD CALC: 31.1 % (ref 35.5–45.6)
HGB BLD-MCNC: 10.3 GM/DL (ref 11.8–15.2)
PLATELET # BLD: 186 K/MM3 (ref 140–440)

## 2018-07-05 RX ADMIN — FAMOTIDINE SCH MG: 10 INJECTION, SOLUTION INTRAVENOUS at 09:31

## 2018-07-05 RX ADMIN — OXYCODONE AND ACETAMINOPHEN PRN TAB: 5; 325 TABLET ORAL at 12:43

## 2018-07-05 RX ADMIN — LOSARTAN POTASSIUM SCH MG: 50 TABLET, FILM COATED ORAL at 09:30

## 2018-07-05 RX ADMIN — ALPRAZOLAM PRN MG: 1 TABLET ORAL at 14:27

## 2018-07-05 RX ADMIN — HYDROCHLOROTHIAZIDE SCH MG: 12.5 CAPSULE ORAL at 09:31

## 2018-07-05 RX ADMIN — OXYCODONE AND ACETAMINOPHEN PRN TAB: 5; 325 TABLET ORAL at 08:16

## 2018-07-05 RX ADMIN — OXYCODONE AND ACETAMINOPHEN PRN TAB: 5; 325 TABLET ORAL at 17:12

## 2018-07-05 RX ADMIN — Medication SCH ML: at 22:00

## 2018-07-05 RX ADMIN — OXYCODONE AND ACETAMINOPHEN PRN TAB: 5; 325 TABLET ORAL at 04:05

## 2018-07-05 RX ADMIN — Medication SCH ML: at 09:32

## 2018-07-05 RX ADMIN — HEPARIN SODIUM SCH MLS/HR: 5000 INJECTION, SOLUTION INTRAVENOUS at 04:10

## 2018-07-05 RX ADMIN — OXYCODONE AND ACETAMINOPHEN PRN TAB: 5; 325 TABLET ORAL at 21:59

## 2018-07-05 RX ADMIN — ALPRAZOLAM PRN MG: 1 TABLET ORAL at 05:56

## 2018-07-05 RX ADMIN — Medication SCH MCG: at 09:32

## 2018-07-05 RX ADMIN — FAMOTIDINE SCH MG: 20 TABLET ORAL at 21:59

## 2018-07-05 RX ADMIN — OXYCODONE AND ACETAMINOPHEN PRN TAB: 5; 325 TABLET ORAL at 00:17

## 2018-07-05 RX ADMIN — MORPHINE SULFATE PRN MG: 2 INJECTION, SOLUTION INTRAMUSCULAR; INTRAVENOUS at 11:01

## 2018-07-05 RX ADMIN — ONDANSETRON PRN MG: 2 INJECTION INTRAMUSCULAR; INTRAVENOUS at 11:01

## 2018-07-05 NOTE — PROGRESS NOTE
Assessment and Plan





- Patient Problems


(1) History of aortic valve replacement with bioprosthetic valve


Current Visit: Yes   Status: Acute   


Plan to address problem: 


Echocardiogram shows normal EF 55-60% and a well functioning prosthetic valve 

with a minimal transaortic gradient of 9 mmHg.








(2) CAD (coronary artery disease)


Current Visit: Yes   Status: Acute   


Qualifiers: 


   Associated angina: with stable angina 


Plan to address problem: 


Coronary disease is stable and asymptomatic.  Continue medical therapy.








Subjective


Date of service: 07/05/18


Interval history: 





Patient is comfortable, no cardiac complaints, looks and feels better.





Objective


 Vital Signs











  Temp Pulse Resp Resp BP Pulse Ox


 


 07/05/18 09:30   58 L    130/79 


 


 07/05/18 08:23     18  


 


 07/05/18 08:16    18   


 


 07/05/18 07:32  98.0 F  54 L  18   130/75  99


 


 07/05/18 04:57  98.3 F  51 L  16   140/79  98


 


 07/04/18 22:39  97.6 F  56 L  18   143/80  97


 


 07/04/18 20:00   71    


 


 07/04/18 19:26  97.8 F  77  18   121/83  98


 


 07/04/18 16:25  97.9 F  65  18   141/86  99


 


 07/04/18 12:04   54 L    














- Physical Examination


General: No Apparent Distress


HEENT: Positive: PERRL


Neck: Positive: neck supple


Cardiac: Positive: Reg Rate and Rhythm


Lungs: Positive: Decreased Breath Sounds


Neuro: Positive: Grossly Intact


Abdomen: Positive: Soft


Skin: Positive: Clear


Extremities: Absent: edema





- Labs and Meds


 CBC











  07/05/18 Range/Units





  04:54 


 


Hgb  10.3 L  (11.8-15.2)  gm/dl


 


Hct  31.1 L  (35.5-45.6)  %


 


Plt Count  186  (140-440)  K/mm3

## 2018-07-05 NOTE — PROGRESS NOTE
Assessment and Plan


Assessment and plan: 


Chest pain due to acute pulmonary embolism.





Acute pulmonary embolism on left.


Seen on CT angiogram of chest.


I informed and discussed with him. He states he has history of PE and DVT and 

was taken off Xarelto few months ago because of history of GI bleed , anemia. 

he also had subdural hematoma last year.


Continue heparin drip.


Consulted Hematology on call, to determine long term anticoagulation options.





Coronary artery disease s/p CABG


Cardiology following.


I discussed case with cardiology





Hypertension





Aortic stenosis s/p aortic valve replacement with bioprosthesis





History of pulmonary embolism





History of DVT





History of peptic ulcer disease





History of subdural hematoma





History of gastric bypass and massive weight loss.





Full code status.








History


Interval history: 


Chest pain,


Mild shortness of breath








Hospitalist Physical





- Physical exam


Narrative exam: 


Gen : Not in acute distress,


HEENT:Normocephalic, atraumatic


Neck: supple, No JVD


Lungs: Clear to auscultation, bilaterally, no rhonchi


Heart :S1 and S2 reg, no murmurs, rubs or gallop


Abd:soft, non tender, non distended, normal bowel sounds


Ext: No edema, no clubbing, no cyanosis, 


Neuro: Awake,alert,oriented x 3, no focal signs


Psych:normal mood














- Constitutional


Vitals: 


 











Temp Pulse Resp BP Pulse Ox


 


 98.0 F   58 L  18   130/79   99 


 


 07/05/18 07:32  07/05/18 09:30  07/05/18 08:23  07/05/18 09:30  07/05/18 07:32














Results





- Labs


CBC & Chem 7: 


 07/06/18 10:29





 07/04/18 04:51


Labs: 


 Laboratory Last Values











WBC  4.8 K/mm3 (4.5-11.0)   07/04/18  04:51    


 


RBC  3.40 M/mm3 (3.65-5.03)  L  07/04/18  04:51    


 


Hgb  10.3 gm/dl (11.8-15.2)  L  07/05/18  04:54    


 


Hct  31.1 % (35.5-45.6)  L  07/05/18  04:54    


 


MCV  91 fl (84-94)   07/04/18  04:51    


 


MCH  31 pg (28-32)   07/04/18  04:51    


 


MCHC  34 % (32-34)   07/04/18  04:51    


 


RDW  16.0 % (13.2-15.2)  H  07/04/18  04:51    


 


Plt Count  186 K/mm3 (140-440)   07/05/18  04:54    


 


Lymph % (Auto)  20.1 % (13.4-35.0)   07/02/18  10:10    


 


Mono % (Auto)  5.1 % (0.0-7.3)   07/02/18  10:10    


 


Eos % (Auto)  0.1 % (0.0-4.3)   07/02/18  10:10    


 


Baso % (Auto)  0.4 % (0.0-1.8)   07/02/18  10:10    


 


Lymph #  0.9 K/mm3 (1.2-5.4)  L  07/02/18  10:10    


 


Mono #  0.2 K/mm3 (0.0-0.8)   07/02/18  10:10    


 


Eos #  0.0 K/mm3 (0.0-0.4)   07/02/18  10:10    


 


Baso #  0.0 K/mm3 (0.0-0.1)   07/02/18  10:10    


 


Seg Neutrophils %  74.3 % (40.0-70.0)  H  07/02/18  10:10    


 


Seg Neutrophils #  3.3 K/mm3 (1.8-7.7)   07/02/18  10:10    


 


PT  12.9 Sec. (12.2-14.9)   07/03/18  13:22    


 


INR  0.93  (0.87-1.13)   07/03/18  13:22    


 


APTT  25.1 Sec. (24.2-36.6)   07/03/18  13:22    


 


D-Dimer  840.79 ng/mlDDU (0-234)  H  07/02/18  10:36    


 


Heparin Anti-Xa Level  0.57 U.I./ml (0.3-0.7)   07/04/18  19:49    


 


Sodium  141 mmol/L (137-145)   07/04/18  04:51    


 


Potassium  3.8 mmol/L (3.6-5.0)   07/04/18  04:51    


 


Chloride  107.8 mmol/L ()  H  07/04/18  04:51    


 


Carbon Dioxide  25 mmol/L (22-30)   07/04/18  04:51    


 


Anion Gap  12 mmol/L  07/04/18  04:51    


 


BUN  18 mg/dL (9-20)   07/04/18  04:51    


 


Creatinine  0.8 mg/dL (0.8-1.5)   07/04/18  04:51    


 


Estimated GFR  > 60 ml/min  07/04/18  04:51    


 


BUN/Creatinine Ratio  23 %  07/04/18  04:51    


 


Glucose  85 mg/dL ()   07/04/18  04:51    


 


Calcium  7.6 mg/dL (8.4-10.2)  L  07/04/18  04:51    


 


Troponin T  < 0.010 ng/mL (0.00-0.029)   07/02/18  19:05    


 


NT-Pro-B Natriuret Pep  5237 pg/mL (0-450)  H  07/02/18  10:11    


 


Triglycerides  119 mg/dL (2-149)   07/02/18  10:11    


 


Cholesterol  150 mg/dL ()   07/02/18  10:11    


 


LDL Cholesterol Direct  73 mg/dL ()   07/02/18  10:11    


 


HDL Cholesterol  68 mg/dL (40-59)  H  07/02/18  10:11    


 


Cholesterol/HDL Ratio  2.20 %  07/02/18  10:11    


 


TSH  1.120 mlU/mL (0.270-4.200)   07/02/18  19:05    


 


Free T4  1.05 ng/dL (0.76-1.46)   07/02/18  19:05

## 2018-07-06 LAB
HCT VFR BLD CALC: 33.2 % (ref 35.5–45.6)
HGB BLD-MCNC: 10.9 GM/DL (ref 11.8–15.2)

## 2018-07-06 RX ADMIN — HYDROCHLOROTHIAZIDE SCH MG: 12.5 CAPSULE ORAL at 10:32

## 2018-07-06 RX ADMIN — Medication SCH MCG: at 10:31

## 2018-07-06 RX ADMIN — LOSARTAN POTASSIUM SCH MG: 50 TABLET, FILM COATED ORAL at 10:32

## 2018-07-06 RX ADMIN — Medication SCH: at 23:25

## 2018-07-06 RX ADMIN — OXYCODONE AND ACETAMINOPHEN PRN TAB: 5; 325 TABLET ORAL at 17:54

## 2018-07-06 RX ADMIN — FAMOTIDINE SCH MG: 20 TABLET ORAL at 10:32

## 2018-07-06 RX ADMIN — ZOLPIDEM TARTRATE PRN MG: 10 TABLET, FILM COATED ORAL at 22:25

## 2018-07-06 RX ADMIN — MORPHINE SULFATE PRN MG: 2 INJECTION, SOLUTION INTRAMUSCULAR; INTRAVENOUS at 20:54

## 2018-07-06 RX ADMIN — ONDANSETRON PRN MG: 2 INJECTION INTRAMUSCULAR; INTRAVENOUS at 08:40

## 2018-07-06 RX ADMIN — OXYCODONE AND ACETAMINOPHEN PRN TAB: 5; 325 TABLET ORAL at 13:20

## 2018-07-06 RX ADMIN — MORPHINE SULFATE PRN MG: 2 INJECTION, SOLUTION INTRAMUSCULAR; INTRAVENOUS at 10:31

## 2018-07-06 RX ADMIN — ZOLPIDEM TARTRATE PRN MG: 10 TABLET, FILM COATED ORAL at 00:12

## 2018-07-06 RX ADMIN — OXYCODONE AND ACETAMINOPHEN PRN TAB: 5; 325 TABLET ORAL at 02:36

## 2018-07-06 RX ADMIN — PANTOPRAZOLE SODIUM SCH MG: 40 TABLET, DELAYED RELEASE ORAL at 22:25

## 2018-07-06 RX ADMIN — HEPARIN SODIUM SCH MLS/HR: 5000 INJECTION, SOLUTION INTRAVENOUS at 08:43

## 2018-07-06 RX ADMIN — OXYCODONE AND ACETAMINOPHEN PRN TAB: 5; 325 TABLET ORAL at 08:41

## 2018-07-06 RX ADMIN — Medication SCH ML: at 10:32

## 2018-07-06 RX ADMIN — ONDANSETRON PRN MG: 2 INJECTION INTRAMUSCULAR; INTRAVENOUS at 15:48

## 2018-07-06 RX ADMIN — ALPRAZOLAM PRN MG: 1 TABLET ORAL at 05:46

## 2018-07-06 RX ADMIN — ALPRAZOLAM PRN MG: 1 TABLET ORAL at 10:32

## 2018-07-06 RX ADMIN — MORPHINE SULFATE PRN MG: 2 INJECTION, SOLUTION INTRAMUSCULAR; INTRAVENOUS at 15:48

## 2018-07-06 RX ADMIN — ALPRAZOLAM PRN MG: 1 TABLET ORAL at 22:25

## 2018-07-06 NOTE — GASTROENTEROLOGY CONSULTATION
History of Present Illness





- Reason for Consult


Consult date: 07/06/18


hematemesis


Requesting physician: RENUKA WILLIAM





- History of Present Illness


Patient is a 48 y/o male with PMH of HTN, aortic stenosis (s/p AVR) PE/DVT, and 

CAD (s/p CABG and stent placement) who presented to ED with c/o CP. He was 

found to have an acute PE upon admission and is now on a heparin gtt. 

Cardiology following. GI has been consulted for hematemesis. This morning 

patient was resting in bed w/o acute distress. He report an episode of 

hematemesis 3 days ago with associated dark stool but no active signs of 

bleeding since that time. No hematochezia. He report a hx of GI bleed due to an 

ulcer approximately 3-6 months ago while in [place]. Denies fever, SOB, 

dizziness, abd pain, N/V, dysphagia, odynophagia, diarrhea, or constipation. 

Stool noted to be light brown upon rectal exam.











Past History


Past Medical History: CAD, hypertension


Past Surgical History: valve replacement, cholecystectomy, CABG, Other (Stent 

placement, Gastric Bypass)


Social history: single.  denies: smoking, alcohol abuse, prescription drug abuse


Family history: hypertension





Medications and Allergies


 Allergies











Allergy/AdvReac Type Severity Reaction Status Date / Time


 


lisinopril Allergy  Shortness Verified 07/02/18 09:58





   of Breath  


 


NSAIDS (Non-Steroidal Allergy  Nausea Verified 07/02/18 09:58





Anti-Inflamma     











 Home Medications











 Medication  Instructions  Recorded  Confirmed  Last Taken  Type


 


ALPRAZolam [Xanax TAB] 1 mg PO TID PRN 07/02/18 07/02/18 Unknown History


 


AtorvaSTATin [Lipitor] 40 mg PO QHS 07/02/18 07/02/18 Unknown History


 


Cyanocobalamin [Vitamin B-12] 1,000 mcg PO DAILY 07/02/18 07/02/18 Unknown 

History


 


Losartan/Hydrochlorothiazide 1 each PO DAILY 07/02/18 07/02/18 Unknown History





[Losartan-Hctz 50-12.5 mg Tab]     


 


Metoprolol [Lopressor TAB] 50 mg PO PRN 07/02/18 07/02/18 Unknown History


 


Pantoprazole [Protonix] 40 mg PO QDAY 07/02/18 07/02/18 Unknown History


 


Zolpidem Tartrate [Ambien CR] 12.5 mg PO QHS 07/02/18 07/02/18 Unknown History











Active Meds: 


Active Medications





Acetaminophen (Tylenol)  650 mg PO Q4H PRN


   PRN Reason: Pain MILD(1-3)/Fever >100.5/HA


   Last Admin: 07/02/18 14:10 Dose:  650 mg


Alprazolam (Xanax)  1 mg PO TID PRN


   PRN Reason: Anxiety


   Last Admin: 07/06/18 10:32 Dose:  1 mg


Atorvastatin Calcium (Lipitor)  40 mg PO QHS [facility]ly [facility], 1928–1983


   Last Admin: 07/05/18 21:59 Dose:  40 mg


Cyanocobalamin (Vitamin B-12)  1,000 mcg PO DAILY Formerly [facility], 1928–1983


   Last Admin: 07/06/18 10:31 Dose:  1,000 mcg


Hydrochlorothiazide (Hctz)  12.5 mg PO QDAY Former[facility], 1928–1983


   Last Admin: 07/06/18 10:32 Dose:  12.5 mg


Heparin Sodium/Sodium Chloride (Heparin/ 0.45% Nacl-25,000 Unit/500 Ml)  25,000 

unit in 500 mls @ 21 mls/hr IV TITR [facility], 1928–1983; Protocol


   Last Admin: 07/06/18 08:43 Dose:  1,450 units/hr, 29 mls/hr


Losartan Potassium (Cozaar)  50 mg PO QDAY [facility], 1928–1983


   Last Admin: 07/06/18 10:32 Dose:  50 mg


Metoprolol Tartrate (Lopressor)  50 mg PO PRN [facility], [facility]–1983


   Last Admin: 07/02/18 18:37 Dose:  50 mg


Morphine Sulfate (Morphine)  2 mg IV Q4H PRN


   PRN Reason: Pain, Moderate (4-6)


   Last Admin: 07/06/18 10:31 Dose:  2 mg


Nitroglycerin (Nitrostat)  0.4 mg SL Q5M PRN


   PRN Reason: Chest Pain


   Last Admin: 07/02/18 13:06 Dose:  0.4 mg


Ondansetron HCl (Zofran)  4 mg IV Q8H PRN


   PRN Reason: Nausea And Vomiting


   Last Admin: 07/06/18 08:40 Dose:  4 mg


Oxycodone/Acetaminophen (Percocet 5/325)  1 tab PO Q4H PRN


   PRN Reason: Pain, Moderate (4-6)


   Last Admin: 07/06/18 08:41 Dose:  1 tab


Pantoprazole Sodium (Protonix)  40 mg PO BID YU


Sodium Chloride (Sodium Chloride Flush Syringe 10 Ml)  10 ml IV BID YU


   Last Admin: 07/06/18 10:32 Dose:  10 ml


Sodium Chloride (Sodium Chloride Flush Syringe 10 Ml)  10 ml IV PRN PRN


   PRN Reason: LINE FLUSH


   Last Admin: 07/03/18 21:01 Dose:  10 ml


Sodium Chloride (Sodium Chloride Flush Syringe 10 Ml)  10 ml IV PRN PRN


   PRN Reason: LINE FLUSH


Zolpidem Tartrate (Ambien)  10 mg PO QHS PRN


   PRN Reason: Insomnia


   Last Admin: 07/06/18 00:12 Dose:  10 mg











Review of Systems





- Review of Systems


Gastrointestinal: hematemesis





Exam





- Constitutional


Vital Signs: 


 











Temp Pulse Resp BP Pulse Ox


 


 98.0 F   61   20   149/83   98 


 


 07/06/18 07:26  07/06/18 07:26  07/06/18 07:26  07/06/18 07:26  07/06/18 07:26











General appearance: no acute distress





- Respiratory


Respiratory: bilateral: CTA (anterior)





- Cardiovascular


Rhythm: regular


Heart Sounds: Present: S1 & S2





- Gastrointestinal


General gastrointestinal: Present: soft, non-tender, non-distended, normal 

bowel sounds





- Neurologic


Neurological: alert and oriented x3





- Labs


CBC & Chem 7: 


 07/06/18 10:29





 07/04/18 04:51


Lab Results: 


 Laboratory Results - last 24 hr











  07/05/18 07/06/18





  20:01 10:29


 


Hgb   10.9 L


 


Hct   33.2 L


 


Heparin Anti-Xa Level  0.53 














Assessment and Plan


1.hematemesis


 2.hx of PUD


 3.hx of gastric bypass


 4.acute PE (on heparin gtt)


 5.CAD (s/p CABG)


 6.aortic stenosis (s/p AVR)





-HGB 10.9-stable


-continue to monitor H/H and transfuse as needed


-no active signs of bleeding x 3 days (stool light brown upon rectal exam)


-currently HD stable


-etiology- possible ulcer vs other GI pathology


-will schedule for EGD in am


-NPO after MN


-INR in am


-hold heparin gtt at 2AM


-continue supportive care


-will follow

## 2018-07-06 NOTE — PROGRESS NOTE
Assessment and Plan


Assessment and plan: 


Chest pain due to acute pulmonary embolism.





Acute pulmonary embolism on left.


Seen on CT angiogram of chest.


I informed and discussed with him. He states he has history of PE and DVT and 

was taken off Xarelto few months ago because of history of GI bleed , anemia. 

he also had subdural hematoma last year.


Continue heparin drip.





Coronary artery disease s/p CABG


Cardiology following.


I discussed case with cardiology





Hematemesis.


consult GI.


Likely EGD in am





Hypertension





Aortic stenosis s/p aortic valve replacement with bioprosthesis





History of pulmonary embolism





History of DVT





History of peptic ulcer disease





History of subdural hematoma





History of gastric bypass and massive weight loss.





Full code status.








History


Interval history: 


Chest pain,


Mild shortness of breath,


vomited blood 5 days ago at home








Hospitalist Physical





- Physical exam


Narrative exam: 


Gen : Not in acute distress,


HEENT:Normocephalic, atraumatic


Neck: supple, No JVD


Lungs: Clear to auscultation, bilaterally, no rhonchi


Heart :S1 and S2 reg, no murmurs, rubs or gallop


Abd:soft, non tender, non distended, normal bowel sounds


Ext: No edema, no clubbing, no cyanosis, 


Neuro: Awake,alert,oriented x 3, no focal signs


Psych:normal mood














- Constitutional


Vitals: 


 











Temp Pulse Resp BP Pulse Ox


 


 98.0 F   61   20   149/83   98 


 


 07/06/18 07:26  07/06/18 07:26  07/06/18 07:26  07/06/18 07:26  07/06/18 07:26














Results





- Labs


CBC & Chem 7: 


 07/06/18 10:29





 07/04/18 04:51


Labs: 


 Laboratory Last Values











WBC  4.8 K/mm3 (4.5-11.0)   07/04/18  04:51    


 


RBC  3.40 M/mm3 (3.65-5.03)  L  07/04/18  04:51    


 


Hgb  10.3 gm/dl (11.8-15.2)  L  07/05/18  04:54    


 


Hct  31.1 % (35.5-45.6)  L  07/05/18  04:54    


 


MCV  91 fl (84-94)   07/04/18  04:51    


 


MCH  31 pg (28-32)   07/04/18  04:51    


 


MCHC  34 % (32-34)   07/04/18  04:51    


 


RDW  16.0 % (13.2-15.2)  H  07/04/18  04:51    


 


Plt Count  186 K/mm3 (140-440)   07/05/18  04:54    


 


Lymph % (Auto)  20.1 % (13.4-35.0)   07/02/18  10:10    


 


Mono % (Auto)  5.1 % (0.0-7.3)   07/02/18  10:10    


 


Eos % (Auto)  0.1 % (0.0-4.3)   07/02/18  10:10    


 


Baso % (Auto)  0.4 % (0.0-1.8)   07/02/18  10:10    


 


Lymph #  0.9 K/mm3 (1.2-5.4)  L  07/02/18  10:10    


 


Mono #  0.2 K/mm3 (0.0-0.8)   07/02/18  10:10    


 


Eos #  0.0 K/mm3 (0.0-0.4)   07/02/18  10:10    


 


Baso #  0.0 K/mm3 (0.0-0.1)   07/02/18  10:10    


 


Seg Neutrophils %  74.3 % (40.0-70.0)  H  07/02/18  10:10    


 


Seg Neutrophils #  3.3 K/mm3 (1.8-7.7)   07/02/18  10:10    


 


PT  12.9 Sec. (12.2-14.9)   07/03/18  13:22    


 


INR  0.93  (0.87-1.13)   07/03/18  13:22    


 


APTT  25.1 Sec. (24.2-36.6)   07/03/18  13:22    


 


D-Dimer  840.79 ng/mlDDU (0-234)  H  07/02/18  10:36    


 


Heparin Anti-Xa Level  0.53 U.I./ml (0.3-0.7)   07/05/18  20:01    


 


Sodium  141 mmol/L (137-145)   07/04/18  04:51    


 


Potassium  3.8 mmol/L (3.6-5.0)   07/04/18  04:51    


 


Chloride  107.8 mmol/L ()  H  07/04/18  04:51    


 


Carbon Dioxide  25 mmol/L (22-30)   07/04/18  04:51    


 


Anion Gap  12 mmol/L  07/04/18  04:51    


 


BUN  18 mg/dL (9-20)   07/04/18  04:51    


 


Creatinine  0.8 mg/dL (0.8-1.5)   07/04/18  04:51    


 


Estimated GFR  > 60 ml/min  07/04/18  04:51    


 


BUN/Creatinine Ratio  23 %  07/04/18  04:51    


 


Glucose  85 mg/dL ()   07/04/18  04:51    


 


Calcium  7.6 mg/dL (8.4-10.2)  L  07/04/18  04:51    


 


Troponin T  < 0.010 ng/mL (0.00-0.029)   07/02/18  19:05    


 


NT-Pro-B Natriuret Pep  5237 pg/mL (0-450)  H  07/02/18  10:11    


 


Triglycerides  119 mg/dL (2-149)   07/02/18  10:11    


 


Cholesterol  150 mg/dL ()   07/02/18  10:11    


 


LDL Cholesterol Direct  73 mg/dL ()   07/02/18  10:11    


 


HDL Cholesterol  68 mg/dL (40-59)  H  07/02/18  10:11    


 


Cholesterol/HDL Ratio  2.20 %  07/02/18  10:11    


 


TSH  1.120 mlU/mL (0.270-4.200)   07/02/18  19:05    


 


Free T4  1.05 ng/dL (0.76-1.46)   07/02/18  19:05

## 2018-07-06 NOTE — PROGRESS NOTE
Assessment and Plan





Chest Pain


Acute pulmonary embolism


CAD s/p CABG


History of bioprosthetic AVR 


   Normal function and normal LVEF by echo this admission


History of GI bleeding


   Patient reports having BRBPR and hematemesis 4-5 days ago


   History of gastric ulcers


History of Subdural hematoma


Recurrent falls





Recommendations:





GI consultation is warranted 


Home on xarelto if ok with GI 


No further cardiac work-up is warranted


Patient is scheduled for follow-up in our clinic on August 3rd at 2:20 pm





Subjective


Date of service: 07/06/18


Principal diagnosis: Chest Pain


Interval history: 





Patient complains of right sided pleuritic chest pain


No events on tele





Objective


 Vital Signs











  Temp Pulse Resp BP BP Pulse Ox


 


 07/06/18 07:26  98.0 F  61  20  149/83   98


 


 07/06/18 04:00   52 L    


 


 07/06/18 03:55    20  141/76  


 


 07/05/18 23:07   50 L     98


 


 07/05/18 23:06   57 L  20  181/82   97


 


 07/05/18 20:10  98.7 F  61  20   134/61  98


 


 07/05/18 20:09   57 L     98


 


 07/05/18 15:50  98.3 F  55 L  18  125/72   98


 


 07/05/18 13:27   60    


 


 07/05/18 12:09  98.1 F  63  16  168/80   98














- Physical Examination


General: No Apparent Distress


HEENT: Positive: PERRL


Neck: Positive: neck supple


Cardiac: Positive: Reg Rate and Rhythm


Lungs: Positive: Normal Exam


Neuro: Positive: Grossly Intact


Abdomen: Positive: Soft


Skin: Positive: Clear


Extremities: Absent: edema

## 2018-07-07 LAB
BASOPHILS # (AUTO): 0 K/MM3 (ref 0–0.1)
BASOPHILS NFR BLD AUTO: 0.5 % (ref 0–1.8)
EOSINOPHIL # BLD AUTO: 0 K/MM3 (ref 0–0.4)
EOSINOPHIL NFR BLD AUTO: 0.9 % (ref 0–4.3)
HCT VFR BLD CALC: 29.1 % (ref 35.5–45.6)
HCT VFR BLD CALC: 32.8 % (ref 35.5–45.6)
HGB BLD-MCNC: 10.9 GM/DL (ref 11.8–15.2)
HGB BLD-MCNC: 9.6 GM/DL (ref 11.8–15.2)
INR PPP: 0.95 (ref 0.87–1.13)
LYMPHOCYTES # BLD AUTO: 1.2 K/MM3 (ref 1.2–5.4)
LYMPHOCYTES NFR BLD AUTO: 31.8 % (ref 13.4–35)
MCH RBC QN AUTO: 31 PG (ref 28–32)
MCHC RBC AUTO-ENTMCNC: 33 % (ref 32–34)
MCV RBC AUTO: 92 FL (ref 84–94)
MONOCYTES # (AUTO): 0.3 K/MM3 (ref 0–0.8)
MONOCYTES % (AUTO): 8.5 % (ref 0–7.3)
PLATELET # BLD: 195 K/MM3 (ref 140–440)
PLATELET # BLD: 204 K/MM3 (ref 140–440)
RBC # BLD AUTO: 3.58 M/MM3 (ref 3.65–5.03)

## 2018-07-07 PROCEDURE — 0DC68ZZ EXTIRPATION OF MATTER FROM STOMACH, VIA NATURAL OR ARTIFICIAL OPENING ENDOSCOPIC: ICD-10-PCS | Performed by: INTERNAL MEDICINE

## 2018-07-07 RX ADMIN — MORPHINE SULFATE PRN MG: 2 INJECTION, SOLUTION INTRAMUSCULAR; INTRAVENOUS at 11:28

## 2018-07-07 RX ADMIN — HEPARIN SODIUM SCH MLS/HR: 5000 INJECTION, SOLUTION INTRAVENOUS at 17:25

## 2018-07-07 RX ADMIN — PANTOPRAZOLE SODIUM SCH MG: 40 TABLET, DELAYED RELEASE ORAL at 22:44

## 2018-07-07 RX ADMIN — PANTOPRAZOLE SODIUM SCH MG: 40 TABLET, DELAYED RELEASE ORAL at 17:19

## 2018-07-07 RX ADMIN — LOSARTAN POTASSIUM SCH MG: 50 TABLET, FILM COATED ORAL at 17:19

## 2018-07-07 RX ADMIN — HYDROCHLOROTHIAZIDE SCH MG: 12.5 CAPSULE ORAL at 17:19

## 2018-07-07 RX ADMIN — Medication SCH MCG: at 17:19

## 2018-07-07 RX ADMIN — MORPHINE SULFATE PRN MG: 2 INJECTION, SOLUTION INTRAMUSCULAR; INTRAVENOUS at 19:42

## 2018-07-07 RX ADMIN — OXYCODONE AND ACETAMINOPHEN PRN TAB: 5; 325 TABLET ORAL at 22:44

## 2018-07-07 RX ADMIN — ALPRAZOLAM PRN MG: 1 TABLET ORAL at 17:20

## 2018-07-07 RX ADMIN — ONDANSETRON PRN MG: 2 INJECTION INTRAMUSCULAR; INTRAVENOUS at 19:43

## 2018-07-07 RX ADMIN — OXYCODONE AND ACETAMINOPHEN PRN TAB: 5; 325 TABLET ORAL at 17:20

## 2018-07-07 RX ADMIN — ALPRAZOLAM PRN MG: 1 TABLET ORAL at 02:16

## 2018-07-07 RX ADMIN — OXYCODONE AND ACETAMINOPHEN PRN TAB: 5; 325 TABLET ORAL at 07:32

## 2018-07-07 RX ADMIN — ALPRAZOLAM PRN MG: 1 TABLET ORAL at 22:44

## 2018-07-07 RX ADMIN — MORPHINE SULFATE PRN MG: 2 INJECTION, SOLUTION INTRAMUSCULAR; INTRAVENOUS at 06:30

## 2018-07-07 RX ADMIN — Medication SCH ML: at 22:47

## 2018-07-07 RX ADMIN — HEPARIN SODIUM SCH MLS/HR: 5000 INJECTION, SOLUTION INTRAVENOUS at 01:28

## 2018-07-07 RX ADMIN — OXYCODONE AND ACETAMINOPHEN PRN TAB: 5; 325 TABLET ORAL at 01:25

## 2018-07-07 RX ADMIN — Medication SCH ML: at 17:20

## 2018-07-07 RX ADMIN — MORPHINE SULFATE PRN MG: 2 INJECTION, SOLUTION INTRAMUSCULAR; INTRAVENOUS at 02:20

## 2018-07-07 NOTE — ANESTHESIA CONSULTATION
Anesthesia Consult and Med Hx


Date of service: 07/07/18





- Airway


Anesthetic Teeth Evaluation: Poor, Chipped


ROM Head & Neck: Adequate


Mental/Hyoid Distance: Adequate


Mallampati Class: Class II


Intubation Access Assessment: Good





- Pulmonary Exam


CTA: Yes





- Cardiac Exam


Cardiac Exam: RRR





- Pre-Operative Health Status


ASA Pre-Surgery Classification: ASA3


Proposed Anesthetic Plan: General





- Pulmonary


Hx Asthma: No


Hx Respiratory Symptoms: Yes (PE (reason for admission))


COPD: No


Hx Pneumonia: Yes (as a child)





- Cardiovascular System


Hx Hypertension: Yes


Hx Heart Attack/AMI: Yes (Pt. denies MI)


Hx Valvular Heart Disease: Yes (Mitral-replaced)





- Endocrine


Hx Renal Disease: Yes (ARF in 2008.  Resolved.)


Hx End Stage Renal Disease: No





- Additional Comments


Anesthesia Medical History Comments: NAC.  Admits to hx as a noncompliant 

diabetic.  S/p gastric bypass.  Claims to no longer have DM since gastric 

bypass (no meds).  Diabetic compications include 2 toe ampurations, and acute 

cellulitis.  Claims that cardiac disease was discovered and surgically 

corrected with no MI.  Multiple chipped teeth in back.

## 2018-07-07 NOTE — PROGRESS NOTE
Assessment and Plan


Assessment and plan: 


Chest pain due to acute pulmonary embolism.





Acute pulmonary embolism on left.


Seen on CT angiogram of chest.


I informed and discussed with him. He states he has history of PE and DVT and 

was taken off Xarelto few months ago because of history of GI bleed , anemia. 

he also had subdural hematoma last year.


Continue heparin drip.





Coronary artery disease s/p CABG


Cardiology following.


I discussed case with cardiology





Hematemesis.


consult GI.


EGD done today revealed gastric ulcers





gastric ulcers. 


Continue Protonix





Hypertension





Aortic stenosis s/p aortic valve replacement with bioprosthesis





History of pulmonary embolism





History of DVT





History of peptic ulcer disease





History of subdural hematoma





History of gastric bypass and massive weight loss.





Full code status.





Discussed with Dr. Escalera.


He recommends can start xarelto tomorrow 








History


Interval history: 


Chest pain,


Mild shortness of breath,


vomited blood 5 days ago at home


EGD done today








Hospitalist Physical





- Physical exam


Narrative exam: 


Gen : Not in acute distress,


HEENT:Normocephalic, atraumatic


Neck: supple, No JVD


Lungs: Clear to auscultation, bilaterally, no rhonchi


Heart :S1 and S2 reg, no murmurs, rubs or gallop


Abd:soft, non tender, non distended, normal bowel sounds


Ext: No edema, no clubbing, no cyanosis, 


Neuro: Awake,alert,oriented x 3, no focal signs


Psych:normal mood














- Constitutional


Vitals: 


 











Temp Pulse Resp BP Pulse Ox


 


 98.1 F   50 L  15   96/56   100 


 


 07/07/18 16:10  07/07/18 16:25  07/07/18 16:25  07/07/18 16:25  07/07/18 16:25














Results





- Labs


CBC & Chem 7: 


 07/07/18 07:05





 07/04/18 04:51


Labs: 


 Laboratory Last Values











WBC  3.9 K/mm3 (4.5-11.0)  L  07/07/18  07:05    


 


RBC  3.58 M/mm3 (3.65-5.03)  L  07/07/18  07:05    


 


Hgb  10.9 gm/dl (11.8-15.2)  L  07/07/18  07:05    


 


Hct  32.8 % (35.5-45.6)  L  07/07/18  07:05    


 


MCV  92 fl (84-94)   07/07/18  07:05    


 


MCH  31 pg (28-32)   07/07/18  07:05    


 


MCHC  33 % (32-34)   07/07/18  07:05    


 


RDW  16.0 % (13.2-15.2)  H  07/07/18  07:05    


 


Plt Count  204 K/mm3 (140-440)   07/07/18  07:05    


 


Lymph % (Auto)  31.8 % (13.4-35.0)   07/07/18  07:05    


 


Mono % (Auto)  8.5 % (0.0-7.3)  H  07/07/18  07:05    


 


Eos % (Auto)  0.9 % (0.0-4.3)   07/07/18  07:05    


 


Baso % (Auto)  0.5 % (0.0-1.8)   07/07/18  07:05    


 


Lymph #  1.2 K/mm3 (1.2-5.4)   07/07/18  07:05    


 


Mono #  0.3 K/mm3 (0.0-0.8)   07/07/18  07:05    


 


Eos #  0.0 K/mm3 (0.0-0.4)   07/07/18  07:05    


 


Baso #  0.0 K/mm3 (0.0-0.1)   07/07/18  07:05    


 


Seg Neutrophils %  58.3 % (40.0-70.0)   07/07/18  07:05    


 


Seg Neutrophils #  2.2 K/mm3 (1.8-7.7)   07/07/18  07:05    


 


PT  13.2 Sec. (12.2-14.9)   07/07/18  07:05    


 


INR  0.95  (0.87-1.13)   07/07/18  07:05    


 


APTT  25.1 Sec. (24.2-36.6)   07/03/18  13:22    


 


D-Dimer  840.79 ng/mlDDU (0-234)  H  07/02/18  10:36    


 


Heparin Anti-Xa Level  0.67 U.I./ml (0.3-0.7)   07/06/18  20:07    


 


Sodium  141 mmol/L (137-145)   07/04/18  04:51    


 


Potassium  3.8 mmol/L (3.6-5.0)   07/04/18  04:51    


 


Chloride  107.8 mmol/L ()  H  07/04/18  04:51    


 


Carbon Dioxide  25 mmol/L (22-30)   07/04/18  04:51    


 


Anion Gap  12 mmol/L  07/04/18  04:51    


 


BUN  18 mg/dL (9-20)   07/04/18  04:51    


 


Creatinine  0.8 mg/dL (0.8-1.5)   07/04/18  04:51    


 


Estimated GFR  > 60 ml/min  07/04/18  04:51    


 


BUN/Creatinine Ratio  23 %  07/04/18  04:51    


 


Glucose  85 mg/dL ()   07/04/18  04:51    


 


Calcium  7.6 mg/dL (8.4-10.2)  L  07/04/18  04:51    


 


Troponin T  < 0.010 ng/mL (0.00-0.029)   07/02/18  19:05    


 


NT-Pro-B Natriuret Pep  5237 pg/mL (0-450)  H  07/02/18  10:11    


 


Triglycerides  119 mg/dL (2-149)   07/02/18  10:11    


 


Cholesterol  150 mg/dL ()   07/02/18  10:11    


 


LDL Cholesterol Direct  73 mg/dL ()   07/02/18  10:11    


 


HDL Cholesterol  68 mg/dL (40-59)  H  07/02/18  10:11    


 


Cholesterol/HDL Ratio  2.20 %  07/02/18  10:11    


 


TSH  1.120 mlU/mL (0.270-4.200)   07/02/18  19:05    


 


Free T4  1.05 ng/dL (0.76-1.46)   07/02/18  19:05

## 2018-07-07 NOTE — PROGRESS NOTE
Assessment and Plan





Acute pulmonary embolism


CAD s/p CABG


History of bioprosthetic AVR 


   Normal function and normal LVEF by echo this admission


History of GI bleeding


   Patient reports having BRBPR and hematemesis 4-5 days ago


   History of gastric ulcers


History of Subdural hematoma


Recurrent falls





Recommendations:





GI consulted and plan for EGD today.


ASA and xarelto held


Continue BB, ARB and statin


Home on xarelto if ok with GI for treatment of PE


No further cardiac work-up is warranted


Patient is scheduled for follow-up in our clinic on August 3rd at 2:20 pm





Subjective


Date of service: 07/07/18


Principal diagnosis: Chest Pain


Interval history: 





No acute events. Resting comfortably. No chest pain or SOB. 





Objective


 Vital Signs











  Temp Pulse Resp BP Pulse Ox


 


 07/07/18 05:31  98.5 F  57 L  20  142/87  98


 


 07/07/18 04:00   52 L   


 


 07/06/18 22:25  97.9 F  59 L  18  172/80  98


 


 07/06/18 22:00    18  


 


 07/06/18 19:31  98.2 F  64  20  130/78  99


 


 07/06/18 16:00   67   


 


 07/06/18 15:09  98.4 F  67  20  132/82  98


 


 07/06/18 11:39  98.5 F  65  20  137/90  98














- Physical Examination


General: No Apparent Distress


HEENT: Positive: PERRL


Neck: Positive: neck supple


Neuro: Positive: Grossly Intact


Abdomen: Positive: Soft


Skin: Positive: Clear


Extremities: Absent: edema





- Labs and Meds


 Coagulation











  07/07/18 Range/Units





  07:05 


 


PT  13.2  (12.2-14.9)  Sec.


 


INR  0.95  (0.87-1.13)  








 CBC











  07/06/18 07/07/18 07/07/18 Range/Units





  10:29 03:22 07:05 


 


WBC    3.9 L  (4.5-11.0)  K/mm3


 


RBC    3.58 L  (3.65-5.03)  M/mm3


 


Hgb  10.9 L  9.6 L  10.9 L  (11.8-15.2)  gm/dl


 


Hct  33.2 L  29.1 L  32.8 L  (35.5-45.6)  %


 


Plt Count   195  204  (140-440)  K/mm3


 


Lymph #    1.2  (1.2-5.4)  K/mm3


 


Mono #    0.3  (0.0-0.8)  K/mm3


 


Eos #    0.0  (0.0-0.4)  K/mm3


 


Baso #    0.0  (0.0-0.1)  K/mm3

## 2018-07-07 NOTE — OPERATIVE REPORT
Operative Report


Operative Report: 





Date of procedure: 07/07/2018





Procedure: Esophagogastroduodenoscopy with removal of foreign bodies  (staples) 

in 2 small gastric ulcers. Gastric bypass anatomy.





Preprocedure diagnosis: History of gastric gastric ulcers now requiring 

anticoagulation for pulmonary emboli





Post procedure diagnosis: Gastric bypass anatomy.  Two 3 mm ulcers with 

embedded staples near the surgical junction.





Endoscopist: Dr. Escalera





Anesthesia: Monitored anesthesia care per anesthesia department





Medications: Propofol per anesthesia





Estimated blood loss: 0





After careful discussion of the nature and purpose of the procedure as well as 

details the technique risks benefits and alternatives consent was obtained.  

The patient was placed in the left lateral decubitus position and medicated per 

anesthesia.  The tip of the [employer]ision  video scope was passed per orum 

under direct vision into the esophagus and advanced into the stomach.  The 

patient had gastric bypass anatomy with a moderately large gastric pouch and 

widely patent  gastrojejunostomy.  The efferent and afferent limbs were widely 

patent.  The scope was withdrawn back into the stomach.  2 small ulcers, 3 mm 

in size were present near the gastrojejunal anastomosis.  There were no visible 

vessels present.  Small staples were embedded in the ulcers and it was elected 

to remove the staples as they were felt to be the likely cause of local 

ulceration.  The staples are removed successfully with the biopsy forceps.  The 

scope was then withdrawn in the forward position.  The stomach body with 

somewhat small in diameter on the fundus was also small but the mucosa normal 

overall.  The esophagogastric junction was at 40 cm.  The esophageal body was 

normal throughout.  The procedure was was well tolerated and the patient was 

observed in recovery.





Impressions: Gastric bypass anatomy.  Small gastric ulcers associated with 

embedded staples.  No stigmata of recent bleeding.  Staples removed.





Plan: Advance diet.  The patient has reasonably good risk to continue 

anticoagulation therapy with Xarelto.  Continue acid suppression for 

prophylaxis with PPI therapy.








Electronically signed:  Angelo Escalera MD

## 2018-07-08 VITALS — SYSTOLIC BLOOD PRESSURE: 154 MMHG | DIASTOLIC BLOOD PRESSURE: 91 MMHG

## 2018-07-08 LAB
HCT VFR BLD CALC: 32 % (ref 35.5–45.6)
HGB BLD-MCNC: 10.7 GM/DL (ref 11.8–15.2)

## 2018-07-08 RX ADMIN — ALPRAZOLAM PRN MG: 1 TABLET ORAL at 04:35

## 2018-07-08 RX ADMIN — Medication SCH MCG: at 10:00

## 2018-07-08 RX ADMIN — OXYCODONE AND ACETAMINOPHEN PRN TAB: 5; 325 TABLET ORAL at 10:00

## 2018-07-08 RX ADMIN — PANTOPRAZOLE SODIUM SCH MG: 40 TABLET, DELAYED RELEASE ORAL at 10:00

## 2018-07-08 RX ADMIN — ONDANSETRON PRN MG: 2 INJECTION INTRAMUSCULAR; INTRAVENOUS at 10:01

## 2018-07-08 RX ADMIN — MORPHINE SULFATE PRN MG: 2 INJECTION, SOLUTION INTRAMUSCULAR; INTRAVENOUS at 07:03

## 2018-07-08 RX ADMIN — HYDROCHLOROTHIAZIDE SCH MG: 12.5 CAPSULE ORAL at 10:00

## 2018-07-08 RX ADMIN — LOSARTAN POTASSIUM SCH MG: 50 TABLET, FILM COATED ORAL at 10:00

## 2018-07-08 RX ADMIN — ZOLPIDEM TARTRATE PRN MG: 10 TABLET, FILM COATED ORAL at 00:03

## 2018-07-08 RX ADMIN — Medication SCH ML: at 10:01

## 2018-07-08 RX ADMIN — OXYCODONE AND ACETAMINOPHEN PRN TAB: 5; 325 TABLET ORAL at 04:35

## 2018-07-08 NOTE — PROGRESS NOTE
Assessment and Plan





Acute pulmonary embolism


CAD s/p CABG


History of bioprosthetic AVR 


   Normal function and normal LVEF by echo this admission


History of GI bleeding


   Patient reports having BRBPR and hematemesis 4-5 days ago


   History of gastric ulcers


History of Subdural hematoma


Recurrent falls





Recommendations:





GI consulted, completed EGD yesterday.


Okay to restart xarelto per GI


Continue BB, ARB and statin


No further cardiac work-up is warranted


Patient is scheduled for follow-up in our clinic on August 3rd at 2:20 pm





Subjective


Date of service: 07/08/18


Principal diagnosis: Chest Pain


Interval history: 





No acute events. Resting comfortably. No chest pain or SOB. 





Objective


 Vital Signs











  Temp Pulse Resp BP Pulse Ox


 


 07/08/18 07:50  97.7 F  57 L  14  126/73  97


 


 07/08/18 05:20  98.0 F  58 L  20  116/77  98


 


 07/08/18 04:00   70   


 


 07/07/18 22:50  97.9 F  67  20  131/92  97


 


 07/07/18 22:00    18  


 


 07/07/18 19:24  98.4 F  91 H  20  132/85  98


 


 07/07/18 17:17  97.9 F  63  18  167/110  99


 


 07/07/18 16:40   57 L  18  144/80  99


 


 07/07/18 16:25   50 L  15  96/56  100


 


 07/07/18 16:10  98.1 F  51 L  14  141/66  100


 


 07/07/18 14:58  98.1 F  63  11 L  178/95  99


 


 07/07/18 12:02  98.2 F   18  138/85 


 


 07/07/18 11:05   57 L   














- Physical Examination


General: No Apparent Distress


HEENT: Positive: PERRL


Neck: Positive: neck supple


Neuro: Positive: Grossly Intact


Abdomen: Positive: Soft


Skin: Positive: Clear


Extremities: Absent: edema





- Labs and Meds


 CBC











  07/08/18 Range/Units





  06:45 


 


Hgb  10.7 L  (11.8-15.2)  gm/dl


 


Hct  32.0 L  (35.5-45.6)  %

## 2018-07-08 NOTE — DISCHARGE SUMMARY
Providers





- Providers


Date of Admission: 


07/02/18 12:37





Date of discharge: 07/08/18


Attending physician: 


RENUKA WILLIAM





 





07/02/18


Consult to Cardiac Rehabilitation [CONS] Routine 


   Reason For Exam: Phase I





07/02/18 11:55


Consult to Physician [CONS] Stat 


   Comment: 


   Consulting Provider: VAUGHN BURTON


   Physician Instructions: 


   Reason For Exam: acs





07/06/18 10:18


Consult to Physician [CONS] Routine 


   Comment: 


   Consulting Provider: AYESHA POWERS


   Physician Instructions: 


   Reason For Exam: hematemesis











Primary care physician: 


PRIMARY CARE MD








Hospitalization


Condition: Fair


Disposition: DC-01 TO HOME OR SELFCARE





Core Measure Documentation





- Palliative Care


Palliative Care/ Comfort Measures: Not Applicable





- Core Measures


Any of the following diagnoses?: DVT/PE





- VTE Discharge Requirements


Deep Vein Thrombosis/Pulmonary Embolism Present on Admission: Yes


Has pt received <5 days of overlap therapy or INR<2.0: No


Anticoagulant overlap therapy prescribed at discharge: No


Contraindication No Overlap Therapy order at DC: Not Indicated (Going home on 

Xarelto)





Exam





- Constitutional


Vitals: 


 











Temp Pulse Resp BP Pulse Ox


 


 98.0 F   58 L  20   116/77   98 


 


 07/08/18 05:20  07/08/18 05:20  07/08/18 05:20  07/08/18 05:20  07/08/18 05:20














Plan


Activity: advance as tolerated


Diet: low fat, low cholesterol, low salt


Additional Instructions: 1.Follow up with PCP in 1 week.  2.Follow up with Dr. Davila on 8/3/18


Follow up with: 


PRIMARY CARE,MD [Primary Care Provider] - 7 Days


Prescriptions: 


HYDROcodone/APAP 5-325 [Pipestem 5/325] 1 each PO Q6HR PRN #10 tablet


 PRN Reason: Pain


Losartan/Hydrochlorothiazide [Losartan-Hctz 50-12.5 mg Tab] 1 each PO DAILY #30 

tablet


Pantoprazole [Protonix TAB] 40 mg PO QDAY #30 tablet


Rivaroxaban [Xarelto] 15 mg PO BIDDIAB 20 Days  tablet


Rivaroxaban [Xarelto] 20 mg PO QDAY #30 tab